# Patient Record
Sex: MALE | Race: WHITE
[De-identification: names, ages, dates, MRNs, and addresses within clinical notes are randomized per-mention and may not be internally consistent; named-entity substitution may affect disease eponyms.]

---

## 2019-03-28 ENCOUNTER — HOSPITAL ENCOUNTER (EMERGENCY)
Dept: HOSPITAL 53 - M ED | Age: 64
Discharge: ANOTHER HOSPITAL | End: 2019-03-28
Payer: COMMERCIAL

## 2019-03-28 VITALS — DIASTOLIC BLOOD PRESSURE: 56 MMHG | SYSTOLIC BLOOD PRESSURE: 119 MMHG

## 2019-03-28 VITALS — HEIGHT: 68 IN | BODY MASS INDEX: 28 KG/M2 | WEIGHT: 184.75 LBS

## 2019-03-28 DIAGNOSIS — F17.210: ICD-10-CM

## 2019-03-28 DIAGNOSIS — Z79.899: ICD-10-CM

## 2019-03-28 DIAGNOSIS — Z79.82: ICD-10-CM

## 2019-03-28 DIAGNOSIS — I50.9: ICD-10-CM

## 2019-03-28 DIAGNOSIS — I25.10: ICD-10-CM

## 2019-03-28 DIAGNOSIS — I21.4: Primary | ICD-10-CM

## 2019-03-28 LAB
ALBUMIN SERPL BCG-MCNC: 3.3 GM/DL (ref 3.2–5.2)
ALT SERPL W P-5'-P-CCNC: 26 U/L (ref 12–78)
APTT BLD: 26.9 SECONDS (ref 25.4–37.6)
BASOPHILS # BLD AUTO: 0.1 10^3/UL (ref 0–0.2)
BASOPHILS NFR BLD AUTO: 0.4 % (ref 0–1)
BILIRUB CONJ SERPL-MCNC: < 0.1 MG/DL (ref 0–0.2)
BILIRUB SERPL-MCNC: 0.4 MG/DL (ref 0.2–1)
BUN SERPL-MCNC: 25 MG/DL (ref 7–18)
CALCIUM SERPL-MCNC: 8.8 MG/DL (ref 8.8–10.2)
CHLORIDE SERPL-SCNC: 110 MEQ/L (ref 98–107)
CK MB CFR.DF SERPL CALC: 10.34
CK MB SERPL-MCNC: 66 NG/ML (ref ?–3.6)
CK SERPL-CCNC: 643 U/L (ref 39–308)
CO2 SERPL-SCNC: 21 MEQ/L (ref 21–32)
CREAT SERPL-MCNC: 1.38 MG/DL (ref 0.7–1.3)
EOSINOPHIL # BLD AUTO: 0 10^3/UL (ref 0–0.5)
EOSINOPHIL NFR BLD AUTO: 0.2 % (ref 0–3)
GFR SERPL CREATININE-BSD FRML MDRD: 55.6 ML/MIN/{1.73_M2} (ref 49–?)
GLUCOSE SERPL-MCNC: 132 MG/DL (ref 70–100)
HCT VFR BLD AUTO: 42.6 % (ref 42–52)
HGB BLD-MCNC: 14.7 G/DL (ref 13.5–17.5)
INR PPP: 1.08
LYMPHOCYTES # BLD AUTO: 1.5 10^3/UL (ref 1.5–4.5)
LYMPHOCYTES NFR BLD AUTO: 8.8 % (ref 24–44)
MCH RBC QN AUTO: 31.5 PG (ref 27–33)
MCHC RBC AUTO-ENTMCNC: 34.5 G/DL (ref 32–36.5)
MCV RBC AUTO: 91.2 FL (ref 80–96)
MONOCYTES # BLD AUTO: 0.8 10^3/UL (ref 0–0.8)
MONOCYTES NFR BLD AUTO: 4.9 % (ref 0–5)
NEUTROPHILS # BLD AUTO: 13.8 10^3/UL (ref 1.8–7.7)
NEUTROPHILS NFR BLD AUTO: 83.3 % (ref 36–66)
NT-PRO BNP: (no result) PG/ML (ref ?–125)
PLATELET # BLD AUTO: 274 10^3/UL (ref 150–450)
POTASSIUM SERPL-SCNC: 4.7 MEQ/L (ref 3.5–5.1)
PROT SERPL-MCNC: 6.7 GM/DL (ref 6.4–8.2)
PROTHROMBIN TIME: 14.2 SECONDS (ref 12.1–14.4)
RBC # BLD AUTO: 4.67 10^6/UL (ref 4.3–6.1)
SODIUM SERPL-SCNC: 141 MEQ/L (ref 136–145)
TROPONIN I SERPL-MCNC: 9.3 NG/ML (ref ?–0.1)
WBC # BLD AUTO: 16.5 10^3/UL (ref 4–10)

## 2019-03-28 NOTE — REP
CHEST, PORTABLE:

 

AP portable view of the chest is performed.  I have no prior study for

comparison.  There is mild cardiomegaly.  There is vascular congestion with

diffuse interstitial edema.  Mediastinal silhouette is grossly unremarkable.

 

IMPRESSION:

 

Mild cardiomegaly.  Vascular congestion and interstitial edema, most consistent

with CHF.

 

 

Electronically Signed by

Bryan Negron MD 03/28/2019 01:55 P

## 2019-03-29 NOTE — ECGEPIP
Stationary ECG Study

                           [facility] - ED

                                       

                                       Test Date:    2019

Pat Name:     SOL STUBBS          Department:   

Patient ID:   [id number]                 Room:         -

Gender:       M                        Technician:   RUBI

:          1955               Requested By: WIL DEUTSCH 

Order Number: [id number]     Reading MD:   Maja Lundborg-Gray

                                 Measurements

Intervals                              Axis          

Rate:         91                       P:            3

AL:           139                      QRS:          -3

QRSD:         110                      T:            42

QT:           425                                    

QTc:          525                                    

                           Interpretive Statements

SINUS RHYTHM

ST DEVIATION AND MODERATE T-WAVE ABNORMALITY, CONSIDER ANTEROLATERAL ISCHEMIA

DELAYED R WAVE PROGRESSION

PROLONGED QTC

 

NO OLD ECG FOR COMPARISON

 

CLINICALLY CORRELATE

 

Electronically Signed On 3- 6:30:32 EDT by Maja Lundborg-Gray